# Patient Record
Sex: FEMALE | Race: WHITE | NOT HISPANIC OR LATINO | URBAN - METROPOLITAN AREA
[De-identification: names, ages, dates, MRNs, and addresses within clinical notes are randomized per-mention and may not be internally consistent; named-entity substitution may affect disease eponyms.]

---
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
## 2023-10-13 NOTE — ED PEDIATRIC TRIAGE NOTE - WEIGHT GM
Detail Level: Zone Sunscreen Recommendations: Sunscreen with a minimum of 30 SPF, zinc or titanium such as [employer] or [employer]. Detail Level: Generalized 67851

## 2024-08-21 DIAGNOSIS — L70.0 ACNE VULGARIS: ICD-10-CM

## 2024-08-22 ENCOUNTER — RX RENEWAL (OUTPATIENT)
Age: 24
End: 2024-08-22

## 2024-08-22 RX ORDER — TRETINOIN 0.5 MG/G
0.05 CREAM TOPICAL
Qty: 135 | Refills: 0 | Status: ACTIVE | COMMUNITY
Start: 2024-08-21 | End: 1900-01-01

## 2024-08-24 RX ORDER — CEFADROXIL 500 MG/1
500 CAPSULE ORAL
Qty: 60 | Refills: 1 | Status: ACTIVE | COMMUNITY
Start: 2024-08-24 | End: 1900-01-01

## 2024-10-16 ENCOUNTER — APPOINTMENT (OUTPATIENT)
Dept: DERMATOLOGY | Facility: CLINIC | Age: 24
End: 2024-10-16
Payer: COMMERCIAL

## 2024-10-16 DIAGNOSIS — L70.0 ACNE VULGARIS: ICD-10-CM

## 2024-10-16 PROCEDURE — 99203 OFFICE O/P NEW LOW 30 MIN: CPT

## 2024-10-16 RX ORDER — SULFAMETHOXAZOLE AND TRIMETHOPRIM 400; 80 MG/1; MG/1
400-80 TABLET ORAL DAILY
Qty: 30 | Refills: 2 | Status: ACTIVE | COMMUNITY
Start: 2024-10-16 | End: 1900-01-01

## 2024-12-19 ENCOUNTER — APPOINTMENT (OUTPATIENT)
Dept: DERMATOLOGY | Facility: CLINIC | Age: 24
End: 2024-12-19

## 2024-12-19 DIAGNOSIS — L70.0 ACNE VULGARIS: ICD-10-CM

## 2024-12-19 PROCEDURE — 99212 OFFICE O/P EST SF 10 MIN: CPT

## 2025-05-23 ENCOUNTER — TRANSCRIPTION ENCOUNTER (OUTPATIENT)
Age: 25
End: 2025-05-23

## 2025-06-19 ENCOUNTER — APPOINTMENT (OUTPATIENT)
Dept: DERMATOLOGY | Facility: CLINIC | Age: 25
End: 2025-06-19

## 2025-06-19 PROCEDURE — 99214 OFFICE O/P EST MOD 30 MIN: CPT | Mod: 95
